# Patient Record
Sex: MALE | Race: OTHER
[De-identification: names, ages, dates, MRNs, and addresses within clinical notes are randomized per-mention and may not be internally consistent; named-entity substitution may affect disease eponyms.]

---

## 2017-05-19 ENCOUNTER — TRANSCRIPTION ENCOUNTER (OUTPATIENT)
Age: 22
End: 2017-05-19

## 2017-12-18 ENCOUNTER — EMERGENCY (EMERGENCY)
Facility: HOSPITAL | Age: 22
LOS: 1 days | Discharge: HOME | End: 2017-12-18
Attending: EMERGENCY MEDICINE | Admitting: EMERGENCY MEDICINE
Payer: MEDICAID

## 2017-12-18 VITALS
OXYGEN SATURATION: 98 % | RESPIRATION RATE: 20 BRPM | TEMPERATURE: 99 F | HEART RATE: 72 BPM | SYSTOLIC BLOOD PRESSURE: 130 MMHG | DIASTOLIC BLOOD PRESSURE: 78 MMHG

## 2017-12-18 PROCEDURE — 99284 EMERGENCY DEPT VISIT MOD MDM: CPT | Mod: 25

## 2017-12-18 NOTE — ED PROVIDER NOTE - PROGRESS NOTE DETAILS
Attending MD Kramer: Dental in ED to see patient Attending MD Kramer: Spoke with DDS who reports fluctuance is not a drainable collection and attribute all symptoms to wisdom tooth.  Recommend continuing Amoxicillin 500mg TID x 7 days which patient has in hand and alternating between Tylenol and Motrin.  Following up with Dr. Carrera.  Labs reviewed with patient.  Stable for discharge. Follow up instructions given, importance of follow up emphasized, return to ED parameters reviewed and patient verbalized understanding.  All questions answered, all concerns addressed.

## 2017-12-18 NOTE — ED ADULT NURSE NOTE - PT NEEDS ASSIST
[facility]  [address] [place] [id number] [phone]               Thank you for choosing us for your health care visit with Dmitriy Baldwin PT.   We are glad to serve you and happy to provide you with this summar [facility] in [facility])    [address]   [phone]           Very little preparation is required for an MRI. You can eat, drink, and take your medication(s).   Prior to the scan, you a Instructions and Information about Your Health     None      Allergies as of Mar 20, 2017     Sulfa Antibiotics Hives    Shellfish-Derived Products Hives    Stomach problems                   Current Medications          This list is accurate as of: 3/20/1 yes

## 2017-12-18 NOTE — ED PROVIDER NOTE - OBJECTIVE STATEMENT
Attending MD Kramer: 22M with no reported PMH, PSH L ankle sx presents to the ED with R lower "wisdom tooth" pain.  Reports that Saturday 12/16/17 afternoon began feeling pain at R lower back tooth and as day wore on developed swelling.  Reports took Amoxicillin 200mg which he had and continued taking until today when he was prescribed Amoxicillin 500mg TID of which he took one pill today.  Reports that since Saturday he has had a fever, including today when he reports "high fever" although denies taking temp as no thermometer.  Reports increased difficulty in opening jaw.  Denies drooling, difficulty swallowing, difficulty breathing, wheezing.  Reports occasional EtOH, denies tobacco, denies drugs.  Tetanus UTD.  Denies allergies.  On exam, head NCAT, +edema at R jaw/cheek area, PERRL, +palpation along gum line with fluctuance at lingual aspect of inferior R tooth #31?, no swelling of tongue, no fullness to base of mouth, no induration to face or neck, unable to fully open jaw, FROM at neck, lungs CTAB with good inspiratory effort, no stridor, no wheezing, +S1S2, no m/r/g; A/P: 22M with suspected dental abscess, will draw CBC, BMP, consult dental

## 2017-12-19 LAB
ANION GAP SERPL CALC-SCNC: 11 MMOL/L — SIGNIFICANT CHANGE UP (ref 5–17)
BASOPHILS # BLD AUTO: 0.1 K/UL — SIGNIFICANT CHANGE UP (ref 0–0.2)
BASOPHILS NFR BLD AUTO: 0.8 % — SIGNIFICANT CHANGE UP (ref 0–2)
BUN SERPL-MCNC: 11 MG/DL — SIGNIFICANT CHANGE UP (ref 7–23)
CALCIUM SERPL-MCNC: 9 MG/DL — SIGNIFICANT CHANGE UP (ref 8.4–10.5)
CHLORIDE SERPL-SCNC: 102 MMOL/L — SIGNIFICANT CHANGE UP (ref 96–108)
CO2 SERPL-SCNC: 26 MMOL/L — SIGNIFICANT CHANGE UP (ref 22–31)
CREAT SERPL-MCNC: 0.75 MG/DL — SIGNIFICANT CHANGE UP (ref 0.5–1.3)
EOSINOPHIL # BLD AUTO: 0.2 K/UL — SIGNIFICANT CHANGE UP (ref 0–0.5)
EOSINOPHIL NFR BLD AUTO: 2.3 % — SIGNIFICANT CHANGE UP (ref 0–6)
GLUCOSE SERPL-MCNC: 103 MG/DL — HIGH (ref 70–99)
HCT VFR BLD CALC: 42.1 % — SIGNIFICANT CHANGE UP (ref 39–50)
HGB BLD-MCNC: 14.1 G/DL — SIGNIFICANT CHANGE UP (ref 13–17)
LYMPHOCYTES # BLD AUTO: 2.7 K/UL — SIGNIFICANT CHANGE UP (ref 1–3.3)
LYMPHOCYTES # BLD AUTO: 30.7 % — SIGNIFICANT CHANGE UP (ref 13–44)
MCHC RBC-ENTMCNC: 31.4 PG — SIGNIFICANT CHANGE UP (ref 27–34)
MCHC RBC-ENTMCNC: 33.6 GM/DL — SIGNIFICANT CHANGE UP (ref 32–36)
MCV RBC AUTO: 93.6 FL — SIGNIFICANT CHANGE UP (ref 80–100)
MONOCYTES # BLD AUTO: 0.9 K/UL — SIGNIFICANT CHANGE UP (ref 0–0.9)
MONOCYTES NFR BLD AUTO: 10.3 % — SIGNIFICANT CHANGE UP (ref 2–14)
NEUTROPHILS # BLD AUTO: 4.8 K/UL — SIGNIFICANT CHANGE UP (ref 1.8–7.4)
NEUTROPHILS NFR BLD AUTO: 55.8 % — SIGNIFICANT CHANGE UP (ref 43–77)
PLATELET # BLD AUTO: 205 K/UL — SIGNIFICANT CHANGE UP (ref 150–400)
POTASSIUM SERPL-MCNC: 4 MMOL/L — SIGNIFICANT CHANGE UP (ref 3.5–5.3)
POTASSIUM SERPL-SCNC: 4 MMOL/L — SIGNIFICANT CHANGE UP (ref 3.5–5.3)
RBC # BLD: 4.49 M/UL — SIGNIFICANT CHANGE UP (ref 4.2–5.8)
RBC # FLD: 11.4 % — SIGNIFICANT CHANGE UP (ref 10.3–14.5)
SODIUM SERPL-SCNC: 139 MMOL/L — SIGNIFICANT CHANGE UP (ref 135–145)
WBC # BLD: 8.7 K/UL — SIGNIFICANT CHANGE UP (ref 3.8–10.5)
WBC # FLD AUTO: 8.7 K/UL — SIGNIFICANT CHANGE UP (ref 3.8–10.5)

## 2017-12-19 PROCEDURE — 99283 EMERGENCY DEPT VISIT LOW MDM: CPT

## 2017-12-19 PROCEDURE — 85027 COMPLETE CBC AUTOMATED: CPT

## 2017-12-19 PROCEDURE — 80048 BASIC METABOLIC PNL TOTAL CA: CPT

## 2017-12-19 NOTE — PROGRESS NOTE ADULT - SUBJECTIVE AND OBJECTIVE BOX
Patient is a 22y old  Male who presents with a chief complaint of pain and swelling on right side    HPI: Patient reports pain started on Saturday and swelling started Sunday.  Patient has been self medicating himself with 600mg amoxicillin 2x day since Saturday.   Patient saw a dentist today and was prescribed amoxicillin 500 TID and reports taking 1 dose so far.     PAST MEDICAL & SURGICAL HISTORY:  No pertinent past medical history. Patient reports ankle surgery in 2009.     ( -) heart valve replacement  ( -) joint replacement      MEDICATIONS  (STANDING):    MEDICATIONS  (PRN):      Allergies    No Known Allergies    Intolerances        Vital Signs Last 24 Hrs  T(C): 37.2 (18 Dec 2017 22:45), Max: 37.2 (18 Dec 2017 22:45)  T(F): 98.9 (18 Dec 2017 22:45), Max: 98.9 (18 Dec 2017 22:45)  HR: 72 (18 Dec 2017 22:45) (72 - 72)  BP: 130/78 (18 Dec 2017 22:45) (130/78 - 130/78)  BP(mean): --  RR: 20 (18 Dec 2017 22:45) (20 - 20)  SpO2: 98% (18 Dec 2017 22:45) (98% - 98%)    EOE:  TMJ ( -  clicks                    ( - ) pops                    ( - ) crepitus             Mandible: limited opening to 25mm             Facial bones and MOM:rossly intact             ( +) trismus: patient can only open up to 25mm, potential guarding due to pain             ( +) LAD: slight on the right side             ( +) swelling : mild right side buccal swelling on the operculum             ( +) asymmetry: due to mild right side buccal swelling              ( +) palpation lower right side             ( - ) SOB             ( +) dysphagia: mild             IOE:  permanent dentition           hard/soft palate:  ( -) palatal torus           tongue/FOM: Potential geographic tongue           labial/buccal mucosa : WNL           (   ) percussion           ( +)palpation : Buccal mucosa and traumatic operculum covering #32           ( +) swelling         Radiographs:Pano    LABS:                        14.1   8.7   )-----------( 205      ( 19 Dec 2017 00:15 )             42.1           WBC Count: 8.7 K/uL [3.8 - 10.5] (12-19 @ 00:15)    Platelet Count - Automated: 205 K/uL [150 - 400] (12-19 @ 00:15)      ASSESSMENT:  Patient has clinical swelling and pain from tooth #32. #32 has radiographic infection and needs to be extra    PROCEDURE:  Verbal and written consent given.     RECOMMENDATIONS:   1) Continue with the prescribed antibiotic  2)F/U with outpatient dentist for comprehensive dental care.   3) If any difficulty swallowing/breathing, fever occur, page dental.     Resident Name Shaila Staples   Oral surgeon consulted: Patient is a 22y old  Male who presents with a chief complaint of pain and swelling on right side    HPI: Patient reports pain started on Saturday and swelling started Sunday.  Patient has been self medicating himself with 600mg( 3 tabs of 200mg) amoxicillin 2x day since Saturday.   Patient saw a dentist today and was prescribed the appropriate amoxicillin 500 TID and reports taking 1 dose so far.     PAST MEDICAL & SURGICAL HISTORY:  No pertinent past medical history. Patient reports ankle surgery in 2009.     ( -) heart valve replacement  ( -) joint replacement      MEDICATIONS  (STANDING):    MEDICATIONS  (PRN):      Allergies    No Known Allergies    Intolerances        Vital Signs Last 24 Hrs  T(C): 37.2 (18 Dec 2017 22:45), Max: 37.2 (18 Dec 2017 22:45)  T(F): 98.9 (18 Dec 2017 22:45), Max: 98.9 (18 Dec 2017 22:45)  HR: 72 (18 Dec 2017 22:45) (72 - 72)  BP: 130/78 (18 Dec 2017 22:45) (130/78 - 130/78)  BP(mean): --  RR: 20 (18 Dec 2017 22:45) (20 - 20)  SpO2: 98% (18 Dec 2017 22:45) (98% - 98%)    EOE:  TMJ ( -  clicks                    ( - ) pops                    ( - ) crepitus             Mandible: limited opening to 25mm             Facial bones and MOM:rossly intact             ( +) trismus: patient can only open up to 25mm, potential guarding due to pain             ( +) LAD: slightly on the right side             ( +) swelling : mild right side swelling             ( +) asymmetry: right side             ( +) palpation: right side, border of mandible was detectable on examination             ( - ) SOB             ( +) dysphagia: mild             IOE:  permanent dentition           hard/soft palate:  ( -) palatal torus           tongue/FOM: Potential geographic tongue           labial/buccal mucosa : WNL           ( + ) percussion: #32           ( +)palpation : Pain on buccal mucosa adjacent to the traumatic operculum covering #32            ( +) swelling : on Operculum covering #32         Radiographs:Pano    LABS:                        14.1   8.7   )-----------( 205      ( 19 Dec 2017 00:15 )             42.1           WBC Count: 8.7 K/uL [3.8 - 10.5] (12-19 @ 00:15)    Platelet Count - Automated: 205 K/uL [150 - 400] (12-19 @ 00:15)      ASSESSMENT:  #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 Panoramic radiograph reveals a radiolucency on the distal aspect of #32 with indicating of possible infection and the necessity of extraction in the future. Patient has no sings of fracture teeth or fractured restorations. Recommend continuing the prescribed antibiotic( amoxicillin 500mg TID) for 7 days to relieve infection and outpatient dental care for #32.      RECOMMENDATIONS:   1) Continue with the prescribed antibiotic  2 Pain management as needed  2)F/U with outpatient dentist for comprehensive dental care.   3) If any difficulty swallowing/breathing, fever occur, page dental.     Resident Name Shaila Staples   Chief consulted: Dr Courtney Galvan DDS

## 2019-02-11 ENCOUNTER — TRANSCRIPTION ENCOUNTER (OUTPATIENT)
Age: 24
End: 2019-02-11

## 2020-11-30 ENCOUNTER — TRANSCRIPTION ENCOUNTER (OUTPATIENT)
Age: 25
End: 2020-11-30

## 2021-07-22 ENCOUNTER — TRANSCRIPTION ENCOUNTER (OUTPATIENT)
Age: 26
End: 2021-07-22

## 2024-04-15 ENCOUNTER — APPOINTMENT (OUTPATIENT)
Dept: ORTHOPEDIC SURGERY | Facility: CLINIC | Age: 29
End: 2024-04-15
Payer: MEDICAID

## 2024-04-15 VITALS
OXYGEN SATURATION: 98 % | HEIGHT: 70 IN | TEMPERATURE: 98.1 F | DIASTOLIC BLOOD PRESSURE: 75 MMHG | HEART RATE: 77 BPM | WEIGHT: 190 LBS | SYSTOLIC BLOOD PRESSURE: 117 MMHG | BODY MASS INDEX: 27.2 KG/M2

## 2024-04-15 DIAGNOSIS — Z98.890 OTHER SPECIFIED POSTPROCEDURAL STATES: ICD-10-CM

## 2024-04-15 DIAGNOSIS — Z78.9 OTHER SPECIFIED HEALTH STATUS: ICD-10-CM

## 2024-04-15 DIAGNOSIS — S82.892D OTHER FRACTURE OF LEFT LOWER LEG, SUBSEQUENT ENCOUNTER FOR CLOSED FRACTURE WITH ROUTINE HEALING: ICD-10-CM

## 2024-04-15 PROCEDURE — 73610 X-RAY EXAM OF ANKLE: CPT | Mod: LT

## 2024-04-15 PROCEDURE — 99203 OFFICE O/P NEW LOW 30 MIN: CPT

## 2024-11-20 VITALS
WEIGHT: 164 LBS | SYSTOLIC BLOOD PRESSURE: 144 MMHG | HEART RATE: 97 BPM | BODY MASS INDEX: 23.48 KG/M2 | DIASTOLIC BLOOD PRESSURE: 80 MMHG | OXYGEN SATURATION: 98 % | HEIGHT: 70 IN

## 2024-12-10 DIAGNOSIS — Z00.00 ENCOUNTER FOR GENERAL ADULT MEDICAL EXAMINATION W/OUT ABNORMAL FINDINGS: ICD-10-CM

## 2024-12-10 DIAGNOSIS — E55.9 VITAMIN D DEFICIENCY, UNSPECIFIED: ICD-10-CM

## 2024-12-10 DIAGNOSIS — Z13.1 ENCOUNTER FOR SCREENING FOR DIABETES MELLITUS: ICD-10-CM

## 2024-12-19 ENCOUNTER — APPOINTMENT (OUTPATIENT)
Dept: INTERNAL MEDICINE | Facility: CLINIC | Age: 29
End: 2024-12-19